# Patient Record
Sex: MALE | Race: WHITE
[De-identification: names, ages, dates, MRNs, and addresses within clinical notes are randomized per-mention and may not be internally consistent; named-entity substitution may affect disease eponyms.]

---

## 2018-04-06 ENCOUNTER — HOSPITAL ENCOUNTER (EMERGENCY)
Dept: HOSPITAL 94 - ER | Age: 78
Discharge: HOME | End: 2018-04-06
Payer: MEDICARE

## 2018-04-06 VITALS — SYSTOLIC BLOOD PRESSURE: 157 MMHG | DIASTOLIC BLOOD PRESSURE: 104 MMHG

## 2018-04-06 VITALS — BODY MASS INDEX: 30.86 KG/M2 | HEIGHT: 71 IN | WEIGHT: 220.46 LBS
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
## 2018-04-06 DIAGNOSIS — R07.81: Primary | ICD-10-CM

## 2018-04-06 DIAGNOSIS — Z79.899: ICD-10-CM

## 2018-04-06 DIAGNOSIS — Z79.82: ICD-10-CM
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
## 2018-04-06 DIAGNOSIS — Z95.1: ICD-10-CM

## 2018-04-06 DIAGNOSIS — Z88.8: ICD-10-CM

## 2018-04-06 DIAGNOSIS — I10: ICD-10-CM

## 2018-04-06 PROCEDURE — 99284 EMERGENCY DEPT VISIT MOD MDM: CPT

## 2018-04-06 PROCEDURE — 71046 X-RAY EXAM CHEST 2 VIEWS: CPT

## 2022-09-20 ENCOUNTER — HOSPITAL ENCOUNTER (EMERGENCY)
Dept: HOSPITAL 94 - ER | Age: 82
Discharge: HOME | End: 2022-09-20
Payer: MEDICARE

## 2022-09-20 VITALS — HEIGHT: 72 IN | WEIGHT: 209.44 LBS | BODY MASS INDEX: 28.37 KG/M2

## 2022-09-20 VITALS — DIASTOLIC BLOOD PRESSURE: 98 MMHG | SYSTOLIC BLOOD PRESSURE: 168 MMHG
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
## 2022-09-20 DIAGNOSIS — Z79.899: ICD-10-CM

## 2022-09-20 DIAGNOSIS — Z98.890: ICD-10-CM

## 2022-09-20 DIAGNOSIS — I11.9: ICD-10-CM

## 2022-09-20 DIAGNOSIS — M25.551: Primary | ICD-10-CM

## 2022-09-20 DIAGNOSIS — Z88.8: ICD-10-CM

## 2022-09-20 PROCEDURE — 73551 X-RAY EXAM OF FEMUR 1: CPT

## 2022-09-20 PROCEDURE — 72170 X-RAY EXAM OF PELVIS: CPT

## 2022-09-20 PROCEDURE — 99284 EMERGENCY DEPT VISIT MOD MDM: CPT

## 2022-09-20 NOTE — NUR
pt unable to bear full weight on leg. attempted to ambulate with cane, but 
still unable. pt then attempted with walker and was able to ambulate approx 50 
feet to and from the restroom. pt states he has a walker at home that he can 
use.